# Patient Record
Sex: MALE
[De-identification: names, ages, dates, MRNs, and addresses within clinical notes are randomized per-mention and may not be internally consistent; named-entity substitution may affect disease eponyms.]

---

## 2019-08-12 ENCOUNTER — HOSPITAL ENCOUNTER (EMERGENCY)
Dept: HOSPITAL 92 - ERS | Age: 19
Discharge: HOME | End: 2019-08-12
Payer: SELF-PAY

## 2019-08-12 DIAGNOSIS — L03.032: Primary | ICD-10-CM

## 2019-08-12 NOTE — RAD
EXAM:

XR Foot Lt 3 View STANDARD



PROVIDED CLINICAL HISTORY:

Pain



FINDINGS:

There is no evidence for fracture or other acute osseous abnormality. Alignment appears anatomic. [place]
nt spaces appear preserved.



IMPRESSION:

No evidence for an acute osseous abnormality. If there is persistent clinical concern, conservative m
anagement and follow-up imaging advised.



Reported By: Stephane Hurt 

Electronically Signed:  8/12/2019 4:37 PM